# Patient Record
Sex: FEMALE | Race: WHITE
[De-identification: names, ages, dates, MRNs, and addresses within clinical notes are randomized per-mention and may not be internally consistent; named-entity substitution may affect disease eponyms.]

---

## 2018-02-05 ENCOUNTER — HOSPITAL ENCOUNTER (OUTPATIENT)
Dept: HOSPITAL 76 - DI | Age: 58
Discharge: HOME | End: 2018-02-05
Attending: NURSE PRACTITIONER
Payer: MEDICAID

## 2018-02-05 DIAGNOSIS — Z12.31: Primary | ICD-10-CM

## 2018-02-05 PROCEDURE — 77067 SCR MAMMO BI INCL CAD: CPT

## 2018-02-15 ENCOUNTER — HOSPITAL ENCOUNTER (OUTPATIENT)
Dept: HOSPITAL 76 - DI.N | Age: 58
Discharge: HOME | End: 2018-02-15
Attending: PHYSICIAN ASSISTANT
Payer: MEDICAID

## 2018-02-15 DIAGNOSIS — M25.521: Primary | ICD-10-CM

## 2018-10-15 ENCOUNTER — HOSPITAL ENCOUNTER (OUTPATIENT)
Dept: HOSPITAL 76 - LAB.N | Age: 58
Discharge: HOME | End: 2018-10-15
Attending: NURSE PRACTITIONER
Payer: MEDICAID

## 2018-10-15 DIAGNOSIS — Z79.899: ICD-10-CM

## 2018-10-15 DIAGNOSIS — M79.7: ICD-10-CM

## 2018-10-15 DIAGNOSIS — I10: Primary | ICD-10-CM

## 2018-10-15 LAB
ALBUMIN DIAFP-MCNC: 4.5 G/DL (ref 3.2–5.5)
ALBUMIN/GLOB SERPL: 1.5 {RATIO} (ref 1–2.2)
ALP SERPL-CCNC: 94 IU/L (ref 42–121)
ALT SERPL W P-5'-P-CCNC: 16 IU/L (ref 10–60)
ANION GAP SERPL CALCULATED.4IONS-SCNC: 11 MMOL/L (ref 6–13)
AST SERPL W P-5'-P-CCNC: 25 IU/L (ref 10–42)
BASOPHILS NFR BLD AUTO: 0.1 10^3/UL (ref 0–0.1)
BASOPHILS NFR BLD AUTO: 0.7 %
BILIRUB BLD-MCNC: 1 MG/DL (ref 0.2–1)
BUN SERPL-MCNC: 15 MG/DL (ref 6–20)
CALCIUM UR-MCNC: 10 MG/DL (ref 8.5–10.3)
CHLORIDE SERPL-SCNC: 104 MMOL/L (ref 101–111)
CHOLEST SERPL-MCNC: 236 MG/DL
CO2 SERPL-SCNC: 25 MMOL/L (ref 21–32)
CREAT SERPLBLD-SCNC: 1.4 MG/DL (ref 0.4–1)
EOSINOPHIL # BLD AUTO: 0.1 10^3/UL (ref 0–0.7)
EOSINOPHIL NFR BLD AUTO: 0.9 %
ERYTHROCYTE [DISTWIDTH] IN BLOOD BY AUTOMATED COUNT: 12.1 % (ref 12–15)
GFRSERPLBLD MDRD-ARVRAT: 38 ML/MIN/{1.73_M2} (ref 89–?)
GLOBULIN SER-MCNC: 3.1 G/DL (ref 2.1–4.2)
GLUCOSE SERPL-MCNC: 91 MG/DL (ref 70–100)
HDLC SERPL-MCNC: 59 MG/DL
HDLC SERPL: 4 {RATIO} (ref ?–4.4)
HGB UR QL STRIP: 15.1 G/DL (ref 12–16)
LDLC SERPL CALC-MCNC: 143 MG/DL
LDLC/HDLC SERPL: 2.4 {RATIO} (ref ?–4.4)
LYMPHOCYTES # SPEC AUTO: 1.9 10^3/UL (ref 1.5–3.5)
LYMPHOCYTES NFR BLD AUTO: 26.3 %
MCH RBC QN AUTO: 31.7 PG (ref 27–31)
MCHC RBC AUTO-ENTMCNC: 33.7 G/DL (ref 32–36)
MCV RBC AUTO: 94.1 FL (ref 81–99)
MONOCYTES # BLD AUTO: 0.4 10^3/UL (ref 0–1)
MONOCYTES NFR BLD AUTO: 5.6 %
NEUTROPHILS # BLD AUTO: 4.7 10^3/UL (ref 1.5–6.6)
NEUTROPHILS # SNV AUTO: 7.1 X10^3/UL (ref 4.8–10.8)
NEUTROPHILS NFR BLD AUTO: 66.5 %
PDW BLD AUTO: 9.4 FL (ref 7.9–10.8)
PLATELET # BLD: 243 10^3/UL (ref 130–450)
PROT SPEC-MCNC: 7.6 G/DL (ref 6.7–8.2)
RBC MAR: 4.74 10^6/UL (ref 4.2–5.4)
SODIUM SERPLBLD-SCNC: 140 MMOL/L (ref 135–145)
VLDLC SERPL-SCNC: 34 MG/DL

## 2018-10-15 PROCEDURE — 84443 ASSAY THYROID STIM HORMONE: CPT

## 2018-10-15 PROCEDURE — 83721 ASSAY OF BLOOD LIPOPROTEIN: CPT

## 2018-10-15 PROCEDURE — 80061 LIPID PANEL: CPT

## 2018-10-15 PROCEDURE — 36415 COLL VENOUS BLD VENIPUNCTURE: CPT

## 2018-10-15 PROCEDURE — 85025 COMPLETE CBC W/AUTO DIFF WBC: CPT

## 2018-11-05 ENCOUNTER — HOSPITAL ENCOUNTER (OUTPATIENT)
Dept: HOSPITAL 76 - LAB.N | Age: 58
End: 2018-11-05
Attending: NURSE PRACTITIONER
Payer: MEDICAID

## 2018-11-05 DIAGNOSIS — R94.4: Primary | ICD-10-CM

## 2018-11-05 LAB
ANION GAP SERPL CALCULATED.4IONS-SCNC: 9 MMOL/L (ref 6–13)
BUN SERPL-MCNC: 22 MG/DL (ref 6–20)
CALCIUM UR-MCNC: 9.2 MG/DL (ref 8.5–10.3)
CHLORIDE SERPL-SCNC: 100 MMOL/L (ref 101–111)
CO2 SERPL-SCNC: 30 MMOL/L (ref 21–32)
CREAT SERPLBLD-SCNC: 1.3 MG/DL (ref 0.4–1)
GFRSERPLBLD MDRD-ARVRAT: 42 ML/MIN/{1.73_M2} (ref 89–?)
GLUCOSE SERPL-MCNC: 92 MG/DL (ref 70–100)
SODIUM SERPLBLD-SCNC: 139 MMOL/L (ref 135–145)

## 2018-11-05 PROCEDURE — 36415 COLL VENOUS BLD VENIPUNCTURE: CPT

## 2018-11-05 PROCEDURE — 80048 BASIC METABOLIC PNL TOTAL CA: CPT

## 2018-12-21 ENCOUNTER — HOSPITAL ENCOUNTER (EMERGENCY)
Dept: HOSPITAL 76 - ED | Age: 58
Discharge: HOME | End: 2018-12-21
Payer: MEDICAID

## 2018-12-21 ENCOUNTER — HOSPITAL ENCOUNTER (OUTPATIENT)
Dept: HOSPITAL 76 - EMS | Age: 58
Discharge: ANOTHER HOSPITAL | End: 2018-12-21
Attending: SURGERY
Payer: MEDICAID

## 2018-12-21 VITALS — SYSTOLIC BLOOD PRESSURE: 127 MMHG | DIASTOLIC BLOOD PRESSURE: 95 MMHG
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
## 2018-12-21 DIAGNOSIS — T23.241A: Primary | ICD-10-CM

## 2018-12-21 DIAGNOSIS — T23.232A: ICD-10-CM

## 2018-12-21 DIAGNOSIS — F17.200: ICD-10-CM

## 2018-12-21 DIAGNOSIS — T31.0: ICD-10-CM
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
## 2018-12-21 PROCEDURE — 16020 DRESS/DEBRID P-THICK BURN S: CPT

## 2018-12-21 PROCEDURE — 99284 EMERGENCY DEPT VISIT MOD MDM: CPT

## 2018-12-21 PROCEDURE — 36415 COLL VENOUS BLD VENIPUNCTURE: CPT

## 2018-12-21 PROCEDURE — 90471 IMMUNIZATION ADMIN: CPT

## 2018-12-21 PROCEDURE — 90715 TDAP VACCINE 7 YRS/> IM: CPT

## 2018-12-21 PROCEDURE — 96376 TX/PRO/DX INJ SAME DRUG ADON: CPT

## 2018-12-21 PROCEDURE — 99283 EMERGENCY DEPT VISIT LOW MDM: CPT

## 2018-12-21 PROCEDURE — 96374 THER/PROPH/DIAG INJ IV PUSH: CPT

## 2018-12-21 RX ADMIN — TETANUS TOXOID, REDUCED DIPHTHERIA TOXOID AND ACELLULAR PERTUSSIS VACCINE, ADSORBED ONE ML: 5; 2.5; 8; 8; 2.5 SUSPENSION INTRAMUSCULAR at 07:22

## 2019-08-05 ENCOUNTER — HOSPITAL ENCOUNTER (OUTPATIENT)
Dept: HOSPITAL 76 - DI | Age: 59
Discharge: HOME | End: 2019-08-05
Attending: NURSE PRACTITIONER
Payer: MEDICAID

## 2019-08-05 DIAGNOSIS — I73.9: Primary | ICD-10-CM

## 2019-08-05 PROCEDURE — 93925 LOWER EXTREMITY STUDY: CPT

## 2019-08-05 PROCEDURE — 93970 EXTREMITY STUDY: CPT

## 2020-11-20 ENCOUNTER — DASHBOARD ENCOUNTERS (OUTPATIENT)
Age: 60
End: 2020-11-20

## 2020-11-20 ENCOUNTER — OFFICE VISIT (OUTPATIENT)
Dept: URBAN - METROPOLITAN AREA CLINIC 40 | Facility: CLINIC | Age: 60
End: 2020-11-20
Payer: OTHER GOVERNMENT

## 2020-11-20 DIAGNOSIS — Z12.11 ENCOUNTER FOR SCREENING COLONOSCOPY: ICD-10-CM

## 2020-11-20 PROCEDURE — 99203 OFFICE O/P NEW LOW 30 MIN: CPT | Performed by: INTERNAL MEDICINE

## 2020-11-20 PROCEDURE — G8417 CALC BMI ABV UP PARAM F/U: HCPCS | Performed by: INTERNAL MEDICINE

## 2020-11-20 PROCEDURE — G8427 DOCREV CUR MEDS BY ELIG CLIN: HCPCS | Performed by: INTERNAL MEDICINE

## 2020-11-20 PROCEDURE — 3017F COLORECTAL CA SCREEN DOC REV: CPT | Performed by: INTERNAL MEDICINE

## 2020-11-20 RX ORDER — SODIUM, POTASSIUM,MAG SULFATES 17.5-3.13G
ONCE SOLUTION, RECONSTITUTED, ORAL ORAL
Qty: 1 KIT | Refills: 0 | OUTPATIENT
Start: 2020-11-20 | End: 2020-11-21

## 2020-11-20 NOTE — PHYSICAL EXAM GASTROINTESTINAL
Abdomen  Obese, soft, nontender, nondistended , no guarding or rigidity, large ventral hernia of lower abdomen. Incision of lower abdomen, normal bowel sounds , Liver and Spleen , no hepatomegaly present , no hepatosplenomegaly , liver nontender , spleen not palpable

## 2020-11-20 NOTE — HPI-TODAY'S VISIT:
Mrs. Levi is a 60 year old white female who presents to the office today to schedule screening colonoscopy.  Reports that she had a normal exam greater than 5 years ago when she was living in [place], [place].  She has no GI complaints today.  History of ventral hernia and mesh placement many years ago.  Since that time, she has had intermittent lower abdominal cramping, nerve pain and numbness.  Denies nausea or vomiting.  Good appetite.  Significant weight gain, she reports that she has become more sedentary and as she has gotten older.  She is a caregiver for her .  Does walk daily.  Fair diet, she believes.  No changes in bowel habits, rectal bleeding or melena.  Denies a family history of colon cancer.  She does not believe polyps removed with her prior exam.  Former smoker.  Occasional alcohol.  No IV or recreational drug use.  She is also had prior hysterectomy.  Has not been seen by surgeon recently to address her hernia.  States that she has had CT imaging in the past for follow-up with no significant findings.

## 2020-12-01 ENCOUNTER — TELEPHONE ENCOUNTER (OUTPATIENT)
Dept: URBAN - METROPOLITAN AREA CLINIC 40 | Facility: CLINIC | Age: 60
End: 2020-12-01

## 2020-12-03 ENCOUNTER — OFFICE VISIT (OUTPATIENT)
Dept: URBAN - METROPOLITAN AREA SURGERY CENTER 30 | Facility: SURGERY CENTER | Age: 60
End: 2020-12-03
Payer: OTHER GOVERNMENT

## 2020-12-03 DIAGNOSIS — Z12.11 COLON CANCER SCREENING: ICD-10-CM

## 2020-12-03 PROCEDURE — G8907 PT DOC NO EVENTS ON DISCHARG: HCPCS | Performed by: INTERNAL MEDICINE

## 2020-12-03 PROCEDURE — G9935 CANC NOT DETECTD DURING SRCN: HCPCS | Performed by: INTERNAL MEDICINE

## 2020-12-03 PROCEDURE — 45378 DIAGNOSTIC COLONOSCOPY: CPT | Performed by: INTERNAL MEDICINE

## 2021-02-02 ENCOUNTER — HOSPITAL ENCOUNTER (OUTPATIENT)
Dept: HOSPITAL 76 - LAB.WCP | Age: 61
Discharge: HOME | End: 2021-02-02
Attending: NURSE PRACTITIONER
Payer: MEDICAID

## 2021-02-02 DIAGNOSIS — R94.4: ICD-10-CM

## 2021-02-02 DIAGNOSIS — E78.2: ICD-10-CM

## 2021-02-02 DIAGNOSIS — Z79.899: ICD-10-CM

## 2021-02-02 DIAGNOSIS — J44.9: Primary | ICD-10-CM

## 2021-02-02 DIAGNOSIS — M25.50: ICD-10-CM

## 2021-02-02 DIAGNOSIS — I10: ICD-10-CM

## 2021-02-02 LAB
ALBUMIN DIAFP-MCNC: 4.6 G/DL (ref 3.2–5.5)
ALBUMIN/GLOB SERPL: 1.6 {RATIO} (ref 1–2.2)
ALP SERPL-CCNC: 90 IU/L (ref 42–121)
ALT SERPL W P-5'-P-CCNC: 17 IU/L (ref 10–60)
ANION GAP SERPL CALCULATED.4IONS-SCNC: 15 MMOL/L (ref 6–13)
AST SERPL W P-5'-P-CCNC: 22 IU/L (ref 10–42)
BASOPHILS NFR BLD AUTO: 0.1 10^3/UL (ref 0–0.1)
BASOPHILS NFR BLD AUTO: 1 %
BILIRUB BLD-MCNC: 0.5 MG/DL (ref 0.2–1)
BUN SERPL-MCNC: 12 MG/DL (ref 6–20)
CALCIUM UR-MCNC: 10 MG/DL (ref 8.5–10.3)
CHLORIDE SERPL-SCNC: 104 MMOL/L (ref 101–111)
CHOLEST SERPL-MCNC: 274 MG/DL
CO2 SERPL-SCNC: 24 MMOL/L (ref 21–32)
CREAT SERPLBLD-SCNC: 1 MG/DL (ref 0.4–1)
EOSINOPHIL # BLD AUTO: 0.1 10^3/UL (ref 0–0.7)
EOSINOPHIL NFR BLD AUTO: 1.5 %
ERYTHROCYTE [DISTWIDTH] IN BLOOD BY AUTOMATED COUNT: 11.9 % (ref 12–15)
GLOBULIN SER-MCNC: 2.9 G/DL (ref 2.1–4.2)
GLUCOSE SERPL-MCNC: 100 MG/DL (ref 70–100)
HDLC SERPL-MCNC: 57 MG/DL
HDLC SERPL: 4.8 {RATIO} (ref ?–4.4)
HGB UR QL STRIP: 14.9 G/DL (ref 12–16)
LDLC SERPL CALC-MCNC: 175 MG/DL
LDLC/HDLC SERPL: 3.1 {RATIO} (ref ?–4.4)
LYMPHOCYTES # SPEC AUTO: 2.6 10^3/UL (ref 1.5–3.5)
LYMPHOCYTES NFR BLD AUTO: 29.6 %
MCH RBC QN AUTO: 31 PG (ref 27–31)
MCHC RBC AUTO-ENTMCNC: 32.3 G/DL (ref 32–36)
MCV RBC AUTO: 96.3 FL (ref 81–99)
MONOCYTES # BLD AUTO: 0.5 10^3/UL (ref 0–1)
MONOCYTES NFR BLD AUTO: 5.4 %
NEUTROPHILS # BLD AUTO: 5.5 10^3/UL (ref 1.5–6.6)
NEUTROPHILS # SNV AUTO: 8.8 X10^3/UL (ref 4.8–10.8)
NEUTROPHILS NFR BLD AUTO: 62.2 %
PDW BLD AUTO: 11.2 FL (ref 7.9–10.8)
PLATELET # BLD: 272 10^3/UL (ref 130–450)
PROT SPEC-MCNC: 7.5 G/DL (ref 6.7–8.2)
RBC MAR: 4.8 10^6/UL (ref 4.2–5.4)
RHEUMATOID FACT SER QL: NEGATIVE
VLDLC SERPL-SCNC: 42 MG/DL

## 2021-02-02 PROCEDURE — 86430 RHEUMATOID FACTOR TEST QUAL: CPT

## 2021-02-02 PROCEDURE — 80053 COMPREHEN METABOLIC PANEL: CPT

## 2021-02-02 PROCEDURE — 85651 RBC SED RATE NONAUTOMATED: CPT

## 2021-02-02 PROCEDURE — 86038 ANTINUCLEAR ANTIBODIES: CPT

## 2021-02-02 PROCEDURE — 85025 COMPLETE CBC W/AUTO DIFF WBC: CPT

## 2021-02-02 PROCEDURE — 84443 ASSAY THYROID STIM HORMONE: CPT

## 2021-02-02 PROCEDURE — 36415 COLL VENOUS BLD VENIPUNCTURE: CPT

## 2021-02-02 PROCEDURE — 83721 ASSAY OF BLOOD LIPOPROTEIN: CPT

## 2021-02-02 PROCEDURE — 80061 LIPID PANEL: CPT

## 2021-02-04 LAB — ANA SER QL: NEGATIVE

## 2021-03-25 ENCOUNTER — HOSPITAL ENCOUNTER (OUTPATIENT)
Dept: HOSPITAL 76 - DI.N | Age: 61
Discharge: HOME | End: 2021-03-25
Attending: PHYSICIAN ASSISTANT
Payer: MEDICAID

## 2021-03-25 DIAGNOSIS — M25.50: Primary | ICD-10-CM

## 2022-05-05 ENCOUNTER — HOSPITAL ENCOUNTER (OUTPATIENT)
Dept: HOSPITAL 76 - DI.N | Age: 62
Discharge: HOME | End: 2022-05-05
Attending: PHYSICIAN ASSISTANT
Payer: MEDICAID

## 2022-05-05 DIAGNOSIS — S92.511A: Primary | ICD-10-CM

## 2022-09-23 ENCOUNTER — HOSPITAL ENCOUNTER (OUTPATIENT)
Dept: HOSPITAL 76 - LAB.N | Age: 62
Discharge: HOME | End: 2022-09-23
Attending: NURSE PRACTITIONER
Payer: MEDICAID

## 2022-09-23 DIAGNOSIS — E53.8: ICD-10-CM

## 2022-09-23 DIAGNOSIS — R53.83: ICD-10-CM

## 2022-09-23 DIAGNOSIS — Z11.59: ICD-10-CM

## 2022-09-23 DIAGNOSIS — E78.00: Primary | ICD-10-CM

## 2022-09-23 LAB
ALBUMIN DIAFP-MCNC: 4.4 G/DL (ref 3.2–5.5)
ALBUMIN/GLOB SERPL: 1.5 {RATIO} (ref 1–2.2)
ALP SERPL-CCNC: 76 IU/L (ref 42–121)
ALT SERPL W P-5'-P-CCNC: 13 IU/L (ref 10–60)
ANION GAP SERPL CALCULATED.4IONS-SCNC: 7 MMOL/L (ref 6–13)
AST SERPL W P-5'-P-CCNC: 19 IU/L (ref 10–42)
BASOPHILS NFR BLD AUTO: 0.1 10^3/UL (ref 0–0.1)
BASOPHILS NFR BLD AUTO: 1 %
BILIRUB BLD-MCNC: 0.6 MG/DL (ref 0.2–1)
BUN SERPL-MCNC: 16 MG/DL (ref 6–20)
CALCIUM UR-MCNC: 9.6 MG/DL (ref 8.5–10.3)
CHLORIDE SERPL-SCNC: 106 MMOL/L (ref 101–111)
CHOLEST SERPL-MCNC: 249 MG/DL
CLARITY UR REFRACT.AUTO: (no result)
CO2 SERPL-SCNC: 26 MMOL/L (ref 21–32)
CREAT SERPLBLD-SCNC: 1.1 MG/DL (ref 0.4–1)
EOSINOPHIL # BLD AUTO: 0.1 10^3/UL (ref 0–0.7)
EOSINOPHIL NFR BLD AUTO: 1.7 %
ERYTHROCYTE [DISTWIDTH] IN BLOOD BY AUTOMATED COUNT: 11.6 % (ref 12–15)
GFRSERPLBLD MDRD-ARVRAT: 50 ML/MIN/{1.73_M2} (ref 89–?)
GLOBULIN SER-MCNC: 2.9 G/DL (ref 2.1–4.2)
GLUCOSE SERPL-MCNC: 96 MG/DL (ref 70–100)
GLUCOSE UR QL STRIP.AUTO: NEGATIVE MG/DL
HCT VFR BLD AUTO: 41.4 % (ref 37–47)
HDLC SERPL-MCNC: 52 MG/DL
HDLC SERPL: 4.8 {RATIO} (ref ?–4.4)
HGB UR QL STRIP: 13.9 G/DL (ref 12–16)
KETONES UR QL STRIP.AUTO: NEGATIVE MG/DL
LDLC SERPL CALC-MCNC: 167 MG/DL
LDLC/HDLC SERPL: 3.2 {RATIO} (ref ?–4.4)
LYMPHOCYTES # SPEC AUTO: 2.2 10^3/UL (ref 1.5–3.5)
LYMPHOCYTES NFR BLD AUTO: 29.4 %
MCH RBC QN AUTO: 31.5 PG (ref 27–31)
MCHC RBC AUTO-ENTMCNC: 33.6 G/DL (ref 32–36)
MCV RBC AUTO: 93.9 FL (ref 81–99)
MONOCYTES # BLD AUTO: 0.4 10^3/UL (ref 0–1)
MONOCYTES NFR BLD AUTO: 5.1 %
NEUTROPHILS # BLD AUTO: 4.8 10^3/UL (ref 1.5–6.6)
NEUTROPHILS # SNV AUTO: 7.6 X10^3/UL (ref 4.8–10.8)
NEUTROPHILS NFR BLD AUTO: 62.7 %
NITRITE UR QL STRIP.AUTO: NEGATIVE
NRBC # BLD AUTO: 0 /100WBC
NRBC # BLD AUTO: 0 X10^3/UL
PDW BLD AUTO: 10.5 FL (ref 7.9–10.8)
PH UR STRIP.AUTO: 5.5 PH (ref 5–7.5)
PLATELET # BLD: 270 10^3/UL (ref 130–450)
POTASSIUM SERPL-SCNC: 3.9 MMOL/L (ref 3.5–5)
PROT SPEC-MCNC: 7.3 G/DL (ref 6.7–8.2)
PROT UR STRIP.AUTO-MCNC: NEGATIVE MG/DL
RBC # UR STRIP.AUTO: NEGATIVE /UL
RBC MAR: 4.41 10^6/UL (ref 4.2–5.4)
SODIUM SERPLBLD-SCNC: 139 MMOL/L (ref 135–145)
SP GR UR STRIP.AUTO: 1.02 (ref 1–1.03)
SQUAMOUS URNS QL MICRO: (no result)
TRIGL P FAST SERPL-MCNC: 148 MG/DL
TSH SERPL-ACNC: 0.68 UIU/ML (ref 0.34–5.6)
UROBILINOGEN UR QL STRIP.AUTO: (no result) E.U./DL
UROBILINOGEN UR STRIP.AUTO-MCNC: NEGATIVE MG/DL
VIT B12 SERPL-MCNC: 601 PG/ML (ref 180–914)
VLDLC SERPL-SCNC: 30 MG/DL
WBC # UR MANUAL: (no result) /HPF (ref 0–5)

## 2022-09-23 PROCEDURE — 86803 HEPATITIS C AB TEST: CPT

## 2022-09-23 PROCEDURE — 87086 URINE CULTURE/COLONY COUNT: CPT

## 2022-09-23 PROCEDURE — 82607 VITAMIN B-12: CPT

## 2022-09-23 PROCEDURE — 80061 LIPID PANEL: CPT

## 2022-09-23 PROCEDURE — 36415 COLL VENOUS BLD VENIPUNCTURE: CPT

## 2022-09-23 PROCEDURE — 84443 ASSAY THYROID STIM HORMONE: CPT

## 2022-09-23 PROCEDURE — 81001 URINALYSIS AUTO W/SCOPE: CPT

## 2022-09-23 PROCEDURE — 80053 COMPREHEN METABOLIC PANEL: CPT

## 2022-09-23 PROCEDURE — 83721 ASSAY OF BLOOD LIPOPROTEIN: CPT

## 2022-09-23 PROCEDURE — 85025 COMPLETE CBC W/AUTO DIFF WBC: CPT

## 2022-09-24 LAB — HCV AB S/CO SERPL IA: <0.1 S/CO RATIO (ref 0–0.9)

## 2022-11-07 ENCOUNTER — HOSPITAL ENCOUNTER (OUTPATIENT)
Dept: HOSPITAL 76 - MAC.MOP | Age: 62
Discharge: HOME | End: 2022-11-07
Attending: NURSE PRACTITIONER
Payer: MEDICAID

## 2022-11-07 DIAGNOSIS — R00.2: Primary | ICD-10-CM

## 2022-11-07 PROCEDURE — 93246 EXT ECG>7D<15D RECORDING: CPT

## 2022-12-07 ENCOUNTER — HOSPITAL ENCOUNTER (OUTPATIENT)
Dept: HOSPITAL 76 - MAC.MOP | Age: 62
Discharge: HOME | End: 2022-12-07
Attending: NURSE PRACTITIONER
Payer: MEDICAID

## 2022-12-07 DIAGNOSIS — I49.3: ICD-10-CM

## 2022-12-07 DIAGNOSIS — I49.1: ICD-10-CM

## 2022-12-07 DIAGNOSIS — I47.1: Primary | ICD-10-CM

## 2022-12-07 PROCEDURE — 93248 EXT ECG>7D<15D REV&INTERPJ: CPT

## 2023-08-29 ENCOUNTER — HOSPITAL ENCOUNTER (OUTPATIENT)
Dept: HOSPITAL 76 - DI | Age: 63
Discharge: HOME | End: 2023-08-29
Attending: NURSE PRACTITIONER
Payer: MEDICARE

## 2023-08-29 DIAGNOSIS — N20.0: ICD-10-CM

## 2023-08-29 DIAGNOSIS — N26.1: ICD-10-CM

## 2023-08-29 DIAGNOSIS — R63.4: Primary | ICD-10-CM

## 2023-08-29 DIAGNOSIS — K57.30: ICD-10-CM

## 2023-08-29 DIAGNOSIS — J43.2: ICD-10-CM

## 2023-08-29 PROCEDURE — 71260 CT THORAX DX C+: CPT

## 2023-08-29 PROCEDURE — 74177 CT ABD & PELVIS W/CONTRAST: CPT

## 2024-05-20 ENCOUNTER — HOSPITAL ENCOUNTER (OUTPATIENT)
Dept: HOSPITAL 76 - LAB.N | Age: 64
Discharge: HOME | End: 2024-05-20
Attending: NURSE PRACTITIONER
Payer: MEDICARE

## 2024-05-20 DIAGNOSIS — R53.83: Primary | ICD-10-CM

## 2024-05-20 LAB
ALBUMIN DIAFP-MCNC: 4.5 G/DL (ref 3.2–5.5)
ALBUMIN/GLOB SERPL: 1.7 {RATIO} (ref 1–2.2)
ALP SERPL-CCNC: 93 IU/L (ref 42–121)
ALT SERPL W P-5'-P-CCNC: 14 IU/L (ref 10–60)
ANION GAP SERPL CALCULATED.4IONS-SCNC: 5 MMOL/L (ref 6–13)
AST SERPL W P-5'-P-CCNC: 21 IU/L (ref 10–42)
BASOPHILS NFR BLD AUTO: 0.1 10^3/UL (ref 0–0.1)
BASOPHILS NFR BLD AUTO: 1 %
BILIRUB BLD-MCNC: 0.3 MG/DL (ref 0.2–1)
BUN SERPL-MCNC: 15 MG/DL (ref 6–20)
CALCIUM UR-MCNC: 10.4 MG/DL (ref 8.5–10.3)
CHLORIDE SERPL-SCNC: 107 MMOL/L (ref 101–111)
CO2 SERPL-SCNC: 28 MMOL/L (ref 21–32)
CREAT SERPLBLD-SCNC: 1.3 MG/DL (ref 0.6–1.3)
CRP SERPL-MCNC: < 0.5 MG/DL (ref ?–0.5)
EOSINOPHIL # BLD AUTO: 0.1 10^3/UL (ref 0–0.7)
EOSINOPHIL NFR BLD AUTO: 1.8 %
ERYTHROCYTE [DISTWIDTH] IN BLOOD BY AUTOMATED COUNT: 11.7 % (ref 12–15)
GFRSERPLBLD MDRD-ARVRAT: 41 ML/MIN/{1.73_M2} (ref 89–?)
GLOBULIN SER-MCNC: 2.6 G/DL (ref 2.1–4.2)
GLUCOSE SERPL-MCNC: 113 MG/DL (ref 74–104)
HCT VFR BLD AUTO: 43.7 % (ref 37–47)
HGB UR QL STRIP: 14.1 G/DL (ref 12–16)
LYMPHOCYTES # SPEC AUTO: 1.8 10^3/UL (ref 1.5–3.5)
LYMPHOCYTES NFR BLD AUTO: 25.6 %
MCH RBC QN AUTO: 31.5 PG (ref 27–31)
MCHC RBC AUTO-ENTMCNC: 32.3 G/DL (ref 32–36)
MCV RBC AUTO: 97.5 FL (ref 81–99)
MONOCYTES # BLD AUTO: 0.4 10^3/UL (ref 0–1)
MONOCYTES NFR BLD AUTO: 5.5 %
NEUTROPHILS # BLD AUTO: 4.6 10^3/UL (ref 1.5–6.6)
NEUTROPHILS # SNV AUTO: 7 X10^3/UL (ref 4.8–10.8)
NEUTROPHILS NFR BLD AUTO: 65.8 %
NRBC # BLD AUTO: 0 /100WBC
NRBC # BLD AUTO: 0 X10^3/UL
PDW BLD AUTO: 10.3 FL (ref 7.9–10.8)
PLATELET # BLD: 259 10^3/UL (ref 130–450)
POTASSIUM SERPL-SCNC: 4.8 MMOL/L (ref 3.5–4.5)
PROT SPEC-MCNC: 7.1 G/DL (ref 6.4–8.9)
RBC MAR: 4.48 10^6/UL (ref 4.2–5.4)
SODIUM SERPLBLD-SCNC: 140 MMOL/L (ref 135–145)
TSH SERPL-ACNC: 1.38 UIU/ML (ref 0.34–5.6)

## 2024-05-20 PROCEDURE — 86140 C-REACTIVE PROTEIN: CPT

## 2024-05-20 PROCEDURE — 85651 RBC SED RATE NONAUTOMATED: CPT

## 2024-05-20 PROCEDURE — 36415 COLL VENOUS BLD VENIPUNCTURE: CPT

## 2024-05-20 PROCEDURE — 84443 ASSAY THYROID STIM HORMONE: CPT

## 2024-05-20 PROCEDURE — 80053 COMPREHEN METABOLIC PANEL: CPT

## 2024-05-20 PROCEDURE — 82607 VITAMIN B-12: CPT

## 2024-05-20 PROCEDURE — 85025 COMPLETE CBC W/AUTO DIFF WBC: CPT
